# Patient Record
(demographics unavailable — no encounter records)

---

## 2025-02-25 NOTE — PHYSICAL EXAM
[de-identified] : LUE  sugartong splint Can wiggle finers SITLT warm and well perfused [de-identified] :    Xray with 3 views of the left wrist was done in office today, 2/25/25 , and reviewed by Dr. Miles, demonstrated displaced volar intra-articular distal radius fracture.

## 2025-02-25 NOTE — HISTORY OF PRESENT ILLNESS
----- Message from Ebony Guzman MD sent at 11/22/2022  1:44 PM CST -----  Please let him know that biopsy is indeterminate.  Will await Afirma- this takes a few weeks to result.  We will let him know when we get it.  Thanks   [FreeTextEntry1] : Left distal radius fracture - volar bush DOI 2/24/25  This is a very pleasant 26-year-old male who presents to me after a fall down the stairs yesterday while intoxicated, several stairs.  He noticed immediate pain and swelling to the left wrist was seen in the [facility] emergency department where x-rays were taken and remarkable for a volar shear distal radius fracture which was subsequently reduced and splinted.  He reports that he has significant pain denies any paresthesias and is here today with his stepmother.  Reports taking no medication.  No loss of consciousness however did hit his head which was evaluated in the emergency room.  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

## 2025-02-25 NOTE — DISCUSSION/SUMMARY
[FreeTextEntry1] : This is a very pleasant 26-year-old male with a displaced volar Amin's fracture status post reduction and splinting.  We discussed that this needs operative management as it is an unstable fracture pattern we will continue to displace as it already has.  For best functional outcome I recommended surgery and we discussed the risks of surgery which included but were not limited to, failure of hardware, the need for possibly placing a dorsal bridge plate, wrist and finger stiffness, chronic pain and damage nearby structures.  Benefits would be improved fracture reduction and hopefully function.  We discussed postoperative protocol.  We discussed doing this within the next 7 to 10 days for best outcome.  I told him if they wanted to move forward with surgery with me my clinical coordinator will reach out to them to help them schedule surgery.  They are to talk to her family and get back to me.  All questions were answered.

## 2025-04-07 NOTE — HISTORY OF PRESENT ILLNESS
[FreeTextEntry1] : Left distal radius fracture - volar bush DOI 2/24/25  HPI: This is a very pleasant 26-year-old male who presents to me after a fall down the stairs yesterday while intoxicated, several stairs. He noticed immediate pain and swelling to the left wrist was seen in the [facility] emergency department where x-rays were taken and remarkable for a volar shear distal radius fracture which was subsequently reduced and splinted. He reports that he has significant pain denies any paresthesias and is here today with his stepmother. Reports taking no medication. No loss of consciousness however did hit his head which was evaluated in the emergency room. 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  Interval Hx: 4/7/25: Here today for follow up after loss of follow up due to insurance reasons per patient. Has been in sugartong splint since the injury and reports signficant stiffness and pain.

## 2025-04-07 NOTE — DISCUSSION/SUMMARY
[FreeTextEntry1] : Left distal radius fracture now over 6 weeks out from his injury and a sugar-tong splint.  He has limited range of motion of his fingers and wrist.  On imaging it does appear that he has SL joint space widening with DISI deformity.  Addressing his finger and wrist stiffness he will need to attend hand therapy and a prescription was provided  Addressing his SL widening on imaging he will need to obtain an MRI to further evaluate the integrity of the SL ligament  Addressing his distal radius fracture there are no signs of bony union on imaging so would like to obtain a CT to further evaluate and to evaluate joint incongruity.  Removable wrist brace was provided today and I will see him back in 2 weeks once the CT is completed and he is attended some therapy.  I have spent 35 minutes of time on the encounter which excludes teaching and/or separately reported services

## 2025-04-07 NOTE — PHYSICAL EXAM
[de-identified] : LUE  Splint removed  significant atrophy to forearm and hand cannot make a full fist NO wrist ROM NO pronation or supination  limited elbow ROM SITLT NO swelling [de-identified] :    Xray with 3 views of the left  wrist was done in office today, 4/7/25 , and reviewed by Dr. Miles, demonstrated volar shear fracture with minimal displacement however significant SL widening with DISI

## 2025-05-21 NOTE — DISCUSSION/SUMMARY
[FreeTextEntry1] : He has findings consistent with a left distal radius fracture after a fall downstairs 3 months ago.  The fracture has healed, however, there does appear to be irregularity of the articular surface and he has pain and stiffness.   I had a discussion with the patient regarding today's visit, the prognosis of this diagnosis, and treatment recommendations and options. At this time, I recommended an CT scan to better evaluate his fracture on the articular surface of the radius. He will follow up after his CT scan to review the results and discuss treatment recommendations.   The patient has agreed to the above plan of management and has expressed full understanding. All questions were fully answered to the patient's satisfaction.   My cumulative time spent on this visit included: Preparation for the visit, review of the medical records, review of pertinent diagnostic studies, examination and counseling of the patient on the above diagnosis, treatment plan and prognosis, orders of diagnostic tests, medication and/or appropriate procedures and documentation in the medical records of today's visit.

## 2025-05-21 NOTE — ADDENDUM
[FreeTextEntry1] :  I, Steve Jerez, acted solely as a scribe for Dr. Bundy on this date on 05/21/2025.

## 2025-05-21 NOTE — HISTORY OF PRESENT ILLNESS
[Right] : right hand dominant [FreeTextEntry1] : He comes in today for evaluation of left wrist injury after he fell down stairs 4 months ago. He went to [facility], had x-rays done and was told that he has a fracture.  He was previously treated by Dr. Miles.  She recommended surgery, but surgery was not performed because of insurance issues and he lost his insurance.  This was documented within the chart.  He rates his pain as a 5/10 and has numbness/tingling. He denies any swelling or radiating pain.

## 2025-05-21 NOTE — END OF VISIT
[FreeTextEntry3] : This note was written by Steve Jerez on 05/21/2025 acting solely as a scribe for Dr. Nehemiah Bundy.   All medical record entries made by the Scribe were at my, Dr. Nehemiah Bundy, direction and personally dictated by me on 05/21/2025. I have personally reviewed the chart and agree that the record accurately reflects my personal performance of the history, physical exam, assessment and plan.

## 2025-05-21 NOTE — PHYSICAL EXAM
[de-identified] : - Constitutional: This is a male in no obvious distress.  - Psych: Patient is alert and oriented to person, place and time.  Patient has a normal mood and affect. - Cardiovascular: Normal pulses throughout the upper extremities.  No significant varicosities are noted in the upper extremities. - Neuro: Strength and sensation are intact throughout the upper extremities.  Patient has normal coordination.  ---  Examination of his left wrist and hand demonstrates mild swelling.  There is tenderness along the distal radius dorsally as well as along the dorsal wrist capsule.  He has limitation of flexion and extension of the wrist at approximately 20 degrees.  There is a negative Mccabe sign, but this is difficult to interpret, given his stiffness.  He has full flexion extension of the digits.  He is neurovascularly intact distally. [de-identified] :  PA, lateral, oblique and PA  radiographs of the left wrist demonstrate his distal radius fracture appears healed. There is some irregularity at the articular surface of the radius. There is increase volar tilt on the lateral and his ulnar styloid avulsion fracture has not fully healed.

## 2025-06-11 NOTE — PHYSICAL EXAM
[de-identified] : - Constitutional: This is a male in no obvious distress.  - Psych: Patient is alert and oriented to person, place and time.  Patient has a normal mood and affect. - Cardiovascular: Normal pulses throughout the upper extremities.  No significant varicosities are noted in the upper extremities. - Neuro: Strength and sensation are intact throughout the upper extremities.  Patient has normal coordination.  ---  Examination of his left wrist and hand demonstrates mild swelling.  There is tenderness along the distal radius dorsally as well as along the dorsal wrist capsule.  He has limitation of flexion and extension of the wrist at approximately 30 degrees.  He has full flexion and extension of the digits.  He is neurovascularly intact distally. [de-identified] :  A CAT scan of his left wrist dated 6/2/2025 demonstrated: 1.  Advanced healing of an intra-articular distal radius fracture.  There is some depression of the articular surface volarly noted on the sagittal views 2.  Chronic transverse fracture of the ulnar styloid with minimal displacement.  PA, lateral, oblique and PA  radiographs of the left wrist dated 5/21/2025 demonstrated his distal radius fracture to be healed. There is some irregularity at the articular surface of the radius. There is increase volar tilt on the lateral and his ulnar styloid avulsion fracture has not fully healed.

## 2025-06-11 NOTE — DISCUSSION/SUMMARY
[FreeTextEntry1] : I reviewed the CAT scan results with him.  I had a discussion regarding today's visit, the diagnosis and treatment recommendations and options.  We also discussed changes since the last visit.    I explained to him that his fracture has healed with irregularity of the articular surface.  However, based upon the CAT scan, I do not believe that an osteotomy will necessarily be helpful.  At this time, I recommend first trying a cortisone injection at the left radio-carpal joint.  I have recommended for him to rest the wrist for 2 weeks and then begin a course of occupational therapy.  He was also provided with the appropriate referral to occupational therapy and will follow up in 4 weeks.  He does understand that this may or may not provide him with relief of his symptoms.  If this does not ultimately provide him with relief of his symptoms, then I would recommend surgery, likely first arthroscopy to better evaluate the articular surface and possible debridement.  Finally, the possibility of the need for an osteotomy in the future was discussed with him, if clinically indicated.  The patient has agreed to the above plan of management and has expressed full understanding.  All questions were fully answered to the patient's satisfaction.  My cumulative time spent on today's visit was greater than 30 minutes and included: Preparation for the visit, review of the medical records, review of pertinent diagnostic studies, examination and counseling of the patient on the above diagnosis, treatment plan and prognosis, orders of diagnostic tests, medications and/or appropriate procedures and documentation in the medical records of today's visit.

## 2025-06-11 NOTE — ADDENDUM
[FreeTextEntry1] :  I, Steve Jerez, acted solely as a scribe for Dr. Bundy on this date on 06/11/2025.

## 2025-06-11 NOTE — HISTORY OF PRESENT ILLNESS
[Right] : right hand dominant [FreeTextEntry1] : Follow-up regarding left distal radius fracture after a fall down stairs approximately 4 months ago.  See note from when he was seen in the office 3 weeks ago.  I ordered a CAT scan.  He comes in to review the results discuss treatment recommendations.

## 2025-06-11 NOTE — END OF VISIT
[FreeTextEntry3] : This note was written by Steve Jerez on 06/11/2025 acting solely as a scribe for Dr. Nehemiah Bundy.   All medical record entries made by the Scribe were at my, Dr. Nehemiah Bundy, direction and personally dictated by me on 06/11/2025. I have personally reviewed the chart and agree that the record accurately reflects my personal performance of the history, physical exam, assessment and plan.

## 2025-06-11 NOTE — PHYSICAL EXAM
[de-identified] : - Constitutional: This is a male in no obvious distress.  - Psych: Patient is alert and oriented to person, place and time.  Patient has a normal mood and affect. - Cardiovascular: Normal pulses throughout the upper extremities.  No significant varicosities are noted in the upper extremities. - Neuro: Strength and sensation are intact throughout the upper extremities.  Patient has normal coordination.  ---  Examination of his left wrist and hand demonstrates mild swelling.  There is tenderness along the distal radius dorsally as well as along the dorsal wrist capsule.  He has limitation of flexion and extension of the wrist at approximately 30 degrees.  He has full flexion and extension of the digits.  He is neurovascularly intact distally. [de-identified] :  A CAT scan of his left wrist dated 6/2/2025 demonstrated: 1.  Advanced healing of an intra-articular distal radius fracture.  There is some depression of the articular surface volarly noted on the sagittal views 2.  Chronic transverse fracture of the ulnar styloid with minimal displacement.  PA, lateral, oblique and PA  radiographs of the left wrist dated 5/21/2025 demonstrated his distal radius fracture to be healed. There is some irregularity at the articular surface of the radius. There is increase volar tilt on the lateral and his ulnar styloid avulsion fracture has not fully healed.

## 2025-06-11 NOTE — PROCEDURE
[FreeTextEntry1] : -  After a discussion of risks and benefits, the patient agreed to proceed with a cortisone injection.  -  Side: Left radio-carpal joint. -  Medications injected: 1 cc of 1% Lidocaine and 1 cc of 40mg of Depomedrol, using sterile technique. -  Ultrasound Guidance: Ultrasound guidance, for diagnostic and therapeutic purposes. prior to the injection, to correctly localize the needle within the radiocarpal joint. -  Patient tolerated the procedure well, without complications. -  Patient was told that the pain may worsen for a day or 2, and should then begin to improve.  -  Instructions: Patient was instructed on the use of a wrist splint, ice, anti-inflammatory agents or Tylenol, and activity modification. -  Follow-up: Within 3-4 weeks to assess response to the injection.

## 2025-06-11 NOTE — END OF VISIT
[FreeTextEntry3] : This note was written by Steve Jerez on 06/11/2025 acting solely as a scribe for Dr. Nehemiah Bundy.   All medical record entries made by the Scribe were at my, Dr. Nehemiha Bundy, direction and personally dictated by me on 06/11/2025. I have personally reviewed the chart and agree that the record accurately reflects my personal performance of the history, physical exam, assessment and plan.  anterior cervical R/posterior cervical R/posterior cervical L/anterior cervical L